# Patient Record
Sex: MALE | Race: WHITE | NOT HISPANIC OR LATINO | Employment: PART TIME | URBAN - METROPOLITAN AREA
[De-identification: names, ages, dates, MRNs, and addresses within clinical notes are randomized per-mention and may not be internally consistent; named-entity substitution may affect disease eponyms.]

---

## 2025-06-26 ENCOUNTER — OFFICE VISIT (OUTPATIENT)
Facility: HOSPITAL | Age: 60
End: 2025-06-26
Payer: MEDICAID

## 2025-06-26 ENCOUNTER — HOSPITAL ENCOUNTER (OUTPATIENT)
Dept: RADIOLOGY | Facility: HOSPITAL | Age: 60
Discharge: HOME | End: 2025-06-26
Payer: MEDICAID

## 2025-06-26 VITALS — RESPIRATION RATE: 20 BRPM | HEART RATE: 81 BPM | DIASTOLIC BLOOD PRESSURE: 93 MMHG | SYSTOLIC BLOOD PRESSURE: 143 MMHG

## 2025-06-26 DIAGNOSIS — R20.0 NUMBNESS OF RIGHT FOOT: ICD-10-CM

## 2025-06-26 DIAGNOSIS — M40.04 POSTURAL KYPHOSIS OF THORACIC REGION: Primary | ICD-10-CM

## 2025-06-26 DIAGNOSIS — M40.04 POSTURAL KYPHOSIS OF THORACIC REGION: ICD-10-CM

## 2025-06-26 PROCEDURE — 99214 OFFICE O/P EST MOD 30 MIN: CPT | Performed by: PHYSICAL MEDICINE & REHABILITATION

## 2025-06-26 PROCEDURE — 72072 X-RAY EXAM THORAC SPINE 3VWS: CPT | Performed by: RADIOLOGY

## 2025-06-26 PROCEDURE — 99204 OFFICE O/P NEW MOD 45 MIN: CPT | Performed by: PHYSICAL MEDICINE & REHABILITATION

## 2025-06-26 PROCEDURE — 72072 X-RAY EXAM THORAC SPINE 3VWS: CPT

## 2025-06-26 RX ORDER — IBUPROFEN 800 MG/1
400 TABLET, FILM COATED ORAL EVERY 6 HOURS PRN
COMMUNITY

## 2025-06-26 ASSESSMENT — PAIN SCALES - GENERAL: PAINLEVEL_OUTOF10: 4

## 2025-06-26 NOTE — PROGRESS NOTES
Self referral for lower back pain and numbness to the right foot.  No recent xray or physical therapy.  [place].

## 2025-06-26 NOTE — PROGRESS NOTES
Physical Medicine and Rehabilitation [facility] Consult  06/26/25       Chief Complaint:  Low back pain     HPI:  Santy Jerez is a  59 y.o. F  who presents to the clinic today  for evaluation of thoracic back pain.  Onset:  a fall in 2019, had left femur and hip fx, then had another fall on huis back and his his vertebra  Told that he has compression fracture/collapse.  Location: thoracic back pain  Radiation: middle   Quality: states achy  Duration: constant  Aggravating factors:  sitting in front of computer  Alleviating factors: lying down  Severity: 4  Numbness/Tingling: Yes - R foot, constant, started 1.5 years ago; top and bottom  No foot drop, no weakness  No R leg pain  Bowel or bladder incontinence: No  Pt's current medication regimen includes:      Treatment to date:  Physical therapy: No  Medications taken to date for this complaint include the following:   Ibuprofen but rarely    Prior injections: Yes -   W pain management 2019 w mild relief T spine           Imaging  none  Medical History[1]     Surgical History[2]     There are no active problems to display for this patient.       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   Eyes:  Denies change in visual acuity   HENT:  Denies nasal congestion or sore throat   Respiratory:  Denies cough or shortness of breath   Cardiovascular:  Denies chest pain or edema   GI:  Denies abdominal pain, nausea, vomiting, bloody stools or diarrhea   :  Denies dysuria   Integument:  Denies rash   Neurologic:  As per HPI  MSK: Per above HPI  Endocrine:  Denies polyuria or polydipsia   Lymphatic:  Denies swollen glands   Psychiatric:  Denies depression or anxiety            PHYSICAL EXAM:  BP (!) 143/93 (BP Location: Left arm, Patient Position: Sitting)   Pulse 81   Resp 20      General:  NAD, well developed, M      Psychiatric: appropriate mood & affect.   Cardiovascular:  Normal pedal pulses; no LE edema.  Respiratory:  Normal rate; unlabored breathing.  Skin:  Intact; no erythema; no ecchymosis or rash.  Lymphatic:  No lymphadenopathy or lymphedema.  NEURO:  Alert and appropriate. Speech fluent, conversing appropriately.   Motor:    Rt: HF 5/5, KE 5/5, KF 5/5, DF 5/5, EHL 4+/5, PF 5/5.    Lt: HF 5/5, KE 5/5, KF 5/5, DF 5/5, EHL 4+/5, PF 5/5.  Sensation:     Light touch:decrease dorsal and planter side of foot diffusely  s  Reflexes:     Right:  patellar 2+, achilles 2+,     Left: patellar 2+, achilles 2+,     Babinski's downgoing b/l; no clonus  Gait: Normal, narrow based gait.     MSK:  Inspection reveals no evidence of a pelvic obliqity   Spinal range of motion: Flexion to full° degrees, Extension of 20 degrees.  There is tenderness over the left thoracic paraspinal and T5 area.   Special tests:    Straight leg raise: -bl    Slump test: -bl    Facet loading: -bl          Impression: Santy Jerez is a 59 y.o. M w pmh of HIV presenting with mid thoracic back pain, has ? History of compression fx. Now presents with thoracic kyphosis and thoracic paraspinal pain. Also had R foot numbness but no weakness.       Plan:    Orders Placed This Encounter   Procedures    XR thoracic spine 3 views    Referral to Physical Therapy    EMG & nerve conduction    1. Xr T spine  2. Pt referral for core posture scapular stabilization hep  3. EMG for RLE numbness; radic vs fibular neuropathy  4. Not interested in meds    Fu 8 weeks     Thank you for the consultation.     Kathleen Watson MD  Physical Medicine and Rehabilitation          [1]   Past Medical History:  Diagnosis Date    Dislocation of hip (Multi) 1/2018    Fracture of hip (Multi) 1/2018    Lumbosacral disc disease 5/2019    Osteoporosis 1/2018    Stress fracture 1/2018    Tibia/fibula fracture 1/2018    Unspecified  osteoarthritis, unspecified site 07/15/2019    Osteoarthritis   [2]   Past Surgical History:  Procedure Laterality Date    FEMUR FRACTURE SURGERY  1/2018    HIP ARTHROPLASTY  1/2018    OTHER SURGICAL HISTORY  07/15/2019    Complete colonoscopy   [3]   Family History  Problem Relation Name Age of Onset    Broken bones Sister Mo     Cancer Father ED    [4]   Current Outpatient Medications   Medication Sig Dispense Refill    abacavir-dolutegravir-lamiVUDine (Triumeq) 600- mg tablet Take 1 tablet by mouth once daily.      ibuprofen 800 mg tablet Take 0.5 tablets (400 mg) by mouth every 6 hours if needed for moderate pain (4 - 6).       No current facility-administered medications for this visit.   [5] No Known Allergies  [6]   Social History  Socioeconomic History    Marital status: Single   Tobacco Use    Smoking status: Some Days     Current packs/day: 2.00     Average packs/day: 2.0 packs/day for 15.0 years (30.0 ttl pk-yrs)     Types: Cigarettes    Smokeless tobacco: Never   Substance and Sexual Activity    Alcohol use: Not Currently     Comment: 2-3 times a year maybe    Drug use: Yes     Types: Marijuana     Comment: 1 time a week    Sexual activity: Not Currently     Partners: Female

## 2025-08-28 ENCOUNTER — OFFICE VISIT (OUTPATIENT)
Facility: HOSPITAL | Age: 60
End: 2025-08-28
Payer: MEDICAID

## 2025-08-28 ENCOUNTER — HOSPITAL ENCOUNTER (OUTPATIENT)
Dept: RADIOLOGY | Facility: HOSPITAL | Age: 60
Discharge: HOME | End: 2025-08-28
Payer: MEDICAID

## 2025-08-28 VITALS — RESPIRATION RATE: 22 BRPM | DIASTOLIC BLOOD PRESSURE: 90 MMHG | HEART RATE: 103 BPM | SYSTOLIC BLOOD PRESSURE: 132 MMHG

## 2025-08-28 DIAGNOSIS — M54.16 RIGHT LUMBAR RADICULITIS: ICD-10-CM

## 2025-08-28 DIAGNOSIS — M40.04 POSTURAL KYPHOSIS OF THORACIC REGION: ICD-10-CM

## 2025-08-28 DIAGNOSIS — S22.000A COMPRESSION FRACTURE OF THORACIC VERTEBRA, UNSPECIFIED THORACIC VERTEBRAL LEVEL, INITIAL ENCOUNTER (MULTI): ICD-10-CM

## 2025-08-28 DIAGNOSIS — M79.605 PAIN IN BOTH LOWER EXTREMITIES: ICD-10-CM

## 2025-08-28 DIAGNOSIS — S22.000A COMPRESSION FRACTURE OF THORACIC VERTEBRA, UNSPECIFIED THORACIC VERTEBRAL LEVEL, INITIAL ENCOUNTER (MULTI): Primary | ICD-10-CM

## 2025-08-28 DIAGNOSIS — M79.604 PAIN IN BOTH LOWER EXTREMITIES: ICD-10-CM

## 2025-08-28 PROCEDURE — 72110 X-RAY EXAM L-2 SPINE 4/>VWS: CPT

## 2025-08-28 PROCEDURE — 99214 OFFICE O/P EST MOD 30 MIN: CPT | Performed by: PHYSICAL MEDICINE & REHABILITATION

## 2025-08-28 ASSESSMENT — PAIN SCALES - GENERAL: PAINLEVEL_OUTOF10: 7

## 2025-09-08 ENCOUNTER — APPOINTMENT (OUTPATIENT)
Dept: RADIOLOGY | Facility: CLINIC | Age: 60
End: 2025-09-08
Payer: MEDICAID